# Patient Record
Sex: MALE | Race: WHITE | NOT HISPANIC OR LATINO | Employment: OTHER | URBAN - METROPOLITAN AREA
[De-identification: names, ages, dates, MRNs, and addresses within clinical notes are randomized per-mention and may not be internally consistent; named-entity substitution may affect disease eponyms.]

---

## 2023-03-09 ENCOUNTER — OFFICE VISIT (OUTPATIENT)
Dept: PRIMARY CARE | Facility: CLINIC | Age: 68
End: 2023-03-09
Payer: MEDICARE

## 2023-03-09 VITALS
HEIGHT: 71 IN | WEIGHT: 228.2 LBS | BODY MASS INDEX: 31.95 KG/M2 | TEMPERATURE: 98.1 F | DIASTOLIC BLOOD PRESSURE: 81 MMHG | RESPIRATION RATE: 17 BRPM | HEART RATE: 62 BPM | SYSTOLIC BLOOD PRESSURE: 144 MMHG

## 2023-03-09 DIAGNOSIS — M54.50 ACUTE RIGHT-SIDED LOW BACK PAIN WITHOUT SCIATICA: Primary | ICD-10-CM

## 2023-03-09 PROBLEM — F43.20 ADJUSTMENT DISORDER: Status: ACTIVE | Noted: 2023-03-09

## 2023-03-09 PROBLEM — J18.9 COMMUNITY ACQUIRED PNEUMONIA: Status: RESOLVED | Noted: 2023-03-09 | Resolved: 2023-03-09

## 2023-03-09 PROBLEM — K64.9 HEMORRHOIDS: Status: RESOLVED | Noted: 2023-03-09 | Resolved: 2023-03-09

## 2023-03-09 PROBLEM — R35.0 INCREASED URINARY FREQUENCY: Status: RESOLVED | Noted: 2023-03-09 | Resolved: 2023-03-09

## 2023-03-09 PROBLEM — F43.20 ADJUSTMENT DISORDER: Status: RESOLVED | Noted: 2023-03-09 | Resolved: 2023-03-09

## 2023-03-09 PROBLEM — K63.5 HYPERPLASTIC COLONIC POLYP: Status: ACTIVE | Noted: 2023-03-09

## 2023-03-09 PROBLEM — R35.0 INCREASED URINARY FREQUENCY: Status: ACTIVE | Noted: 2023-03-09

## 2023-03-09 PROBLEM — K64.9 HEMORRHOIDS: Status: ACTIVE | Noted: 2023-03-09

## 2023-03-09 PROBLEM — R93.1 AGATSTON CAC SCORE, >400: Status: ACTIVE | Noted: 2023-03-09

## 2023-03-09 LAB
POC APPEARANCE, URINE: CLEAR
POC BILIRUBIN, URINE: NEGATIVE
POC BLOOD, URINE: NEGATIVE
POC COLOR, URINE: YELLOW
POC GLUCOSE, URINE: NEGATIVE MG/DL
POC KETONES, URINE: NEGATIVE MG/DL
POC LEUKOCYTES, URINE: NEGATIVE
POC NITRITE,URINE: NEGATIVE
POC PH, URINE: 7.5 PH
POC PROTEIN, URINE: NEGATIVE MG/DL
POC SPECIFIC GRAVITY, URINE: 1.01
POC UROBILINOGEN, URINE: 2 EU/DL

## 2023-03-09 PROCEDURE — 1170F FXNL STATUS ASSESSED: CPT | Performed by: FAMILY MEDICINE

## 2023-03-09 PROCEDURE — 81003 URINALYSIS AUTO W/O SCOPE: CPT | Performed by: FAMILY MEDICINE

## 2023-03-09 PROCEDURE — 1159F MED LIST DOCD IN RCRD: CPT | Performed by: FAMILY MEDICINE

## 2023-03-09 PROCEDURE — 1160F RVW MEDS BY RX/DR IN RCRD: CPT | Performed by: FAMILY MEDICINE

## 2023-03-09 PROCEDURE — 3008F BODY MASS INDEX DOCD: CPT | Performed by: FAMILY MEDICINE

## 2023-03-09 PROCEDURE — 99213 OFFICE O/P EST LOW 20 MIN: CPT | Performed by: FAMILY MEDICINE

## 2023-03-09 RX ORDER — ROSUVASTATIN CALCIUM 5 MG/1
1 TABLET, COATED ORAL DAILY
COMMUNITY
End: 2024-02-26 | Stop reason: SDUPTHER

## 2023-03-09 RX ORDER — DICLOFENAC SODIUM 10 MG/G
4 GEL TOPICAL 4 TIMES DAILY PRN
Qty: 240 G | Refills: 1 | Status: SHIPPED | OUTPATIENT
Start: 2023-03-09 | End: 2023-04-08

## 2023-03-09 ASSESSMENT — ACTIVITIES OF DAILY LIVING (ADL)
FEEDING YOURSELF: INDEPENDENT
JUDGMENT_ADEQUATE_SAFELY_COMPLETE_DAILY_ACTIVITIES: YES
HEARING - LEFT EAR: FUNCTIONAL
DRESSING YOURSELF: INDEPENDENT
WALKS IN HOME: INDEPENDENT
ADEQUATE_TO_COMPLETE_ADL: YES
HEARING - RIGHT EAR: FUNCTIONAL
BATHING: INDEPENDENT
GROOMING: INDEPENDENT
TOILETING: INDEPENDENT
PATIENT'S MEMORY ADEQUATE TO SAFELY COMPLETE DAILY ACTIVITIES?: YES

## 2023-03-09 ASSESSMENT — ENCOUNTER SYMPTOMS
LOSS OF SENSATION IN FEET: 0
HEMATURIA: 0
DIFFICULTY URINATING: 0
CHILLS: 0
OCCASIONAL FEELINGS OF UNSTEADINESS: 0
FEVER: 0
DYSURIA: 0

## 2023-03-09 ASSESSMENT — PAIN SCALES - GENERAL: PAINLEVEL: 2

## 2023-03-09 ASSESSMENT — COLUMBIA-SUICIDE SEVERITY RATING SCALE - C-SSRS
6. HAVE YOU EVER DONE ANYTHING, STARTED TO DO ANYTHING, OR PREPARED TO DO ANYTHING TO END YOUR LIFE?: NO
2. HAVE YOU ACTUALLY HAD ANY THOUGHTS OF KILLING YOURSELF?: NO
1. IN THE PAST MONTH, HAVE YOU WISHED YOU WERE DEAD OR WISHED YOU COULD GO TO SLEEP AND NOT WAKE UP?: NO

## 2023-03-09 ASSESSMENT — PATIENT HEALTH QUESTIONNAIRE - PHQ9
1. LITTLE INTEREST OR PLEASURE IN DOING THINGS: NOT AT ALL
2. FEELING DOWN, DEPRESSED OR HOPELESS: NOT AT ALL
SUM OF ALL RESPONSES TO PHQ9 QUESTIONS 1 AND 2: 0

## 2023-03-09 NOTE — PATIENT INSTRUCTIONS
Get xray done of back.     Voltaren gel (Diclofenac) 2 gm up to 4 times a day to affected area for pain.     Medicare wellness with labs first in November.

## 2023-03-09 NOTE — PROGRESS NOTES
"Subjective   Patient ID: Jabari Estrada is a 67 y.o. male who presents for Back Pain (Patient complains of lower back pain that started off and on since January.).    Low back pain, just below right flank, More achey. Kind of feels like when stone up in his kidney before it moved down in the past. Prior to this, was running on the treadmill to practice for his stress test. He is able to play golf and basketball and it hurts but it is not enough to stop him. Goes away overnight.   No problem urinating. He is on [employer] so diet has changed.   No blood in urine.     No particular movements that make it worse, Is there when up walking around.          Review of Systems   Constitutional:  Negative for chills and fever.   Genitourinary:  Negative for decreased urine volume, difficulty urinating, dysuria, hematuria and urgency.           Objective   /81   Pulse 62   Temp 36.7 °C (98.1 °F)   Resp 17   Ht 1.803 m (5' 11\")   Wt 104 kg (228 lb 3.2 oz)   BMI 31.83 kg/m²     Physical Exam  Vitals reviewed.   Constitutional:       General: He is not in acute distress.     Appearance: Normal appearance.   Pulmonary:      Effort: Pulmonary effort is normal.   Musculoskeletal:      Comments: ROM back with limited extension, tender to right of upper lumbar PVMs. No actual CVA tenderness. Normal strength, rom BLE. Tight hamstrings.    Neurological:      Mental Status: He is alert.   Psychiatric:         Mood and Affect: Mood normal.         Behavior: Behavior normal.       Recent Results (from the past 168 hour(s))   POCT UA Automated manually resulted    Collection Time: 03/09/23 11:58 AM   Result Value Ref Range    POC Color, Urine Yellow Straw, Yellow, Light Yellow    POC Appearance, Urine Clear Clear    POC Specific Gravity, Urine 1.010 1.005 - 1.035    POC PH, Urine 7.5 No Reference Range Established PH    POC Protein, Urine NEGATIVE NEGATIVE, 30 (1+) mg/dl    POC Glucose, Urine NEGATIVE NEGATIVE mg/dl    " POC Blood, Urine NEGATIVE NEGATIVE    POC Ketones, Urine NEGATIVE NEGATIVE mg/dl    POC Bilirubin, Urine NEGATIVE NEGATIVE    POC Urobilinogen, Urine 2.0 (A) 0.2, 1.0 EU/DL    Poc Nitrate, Urine NEGATIVE NEGATIVE    POC Leukocytes, Urine NEGATIVE NEGATIVE           Assessment/Plan   Problem List Items Addressed This Visit    None  Visit Diagnoses       Acute right-sided low back pain without sciatica    -  Primary    History urolithiasis, no pain with urination. UA ordered and negative. Check XR. Voltaren gel. Refer to PT if desired.    Relevant Medications    diclofenac sodium (Voltaren) 1 % gel gel    Other Relevant Orders    POCT UA Automated manually resulted (Completed)    XR lumbar spine complete 4+ views    Referral to Physical Therapy              Assessment, plans, tests, and follow up discussed with patient and patient verbalized understanding. Jabari was given an opportunity to ask questions and  any concerns were addressed including but not limited to tests, orders, indications to call.

## 2023-11-09 ENCOUNTER — APPOINTMENT (OUTPATIENT)
Dept: PRIMARY CARE | Facility: CLINIC | Age: 68
End: 2023-11-09
Payer: MEDICARE

## 2023-11-21 ENCOUNTER — OFFICE VISIT (OUTPATIENT)
Dept: PRIMARY CARE | Facility: CLINIC | Age: 68
End: 2023-11-21
Payer: MEDICARE

## 2023-11-21 VITALS
SYSTOLIC BLOOD PRESSURE: 127 MMHG | TEMPERATURE: 97.5 F | HEART RATE: 72 BPM | DIASTOLIC BLOOD PRESSURE: 86 MMHG | HEIGHT: 72 IN | BODY MASS INDEX: 33.13 KG/M2 | RESPIRATION RATE: 14 BRPM | OXYGEN SATURATION: 98 % | WEIGHT: 244.6 LBS

## 2023-11-21 DIAGNOSIS — Z87.891 FORMER SMOKER, STOPPED SMOKING IN DISTANT PAST: ICD-10-CM

## 2023-11-21 DIAGNOSIS — Z23 NEED FOR VACCINATION: ICD-10-CM

## 2023-11-21 DIAGNOSIS — Z00.00 WELL ADULT EXAM: Primary | ICD-10-CM

## 2023-11-21 DIAGNOSIS — Z12.5 SCREENING FOR MALIGNANT NEOPLASM OF PROSTATE: ICD-10-CM

## 2023-11-21 DIAGNOSIS — R93.1 AGATSTON CAC SCORE, >400: ICD-10-CM

## 2023-11-21 DIAGNOSIS — E78.5 HYPERLIPIDEMIA, UNSPECIFIED HYPERLIPIDEMIA TYPE: ICD-10-CM

## 2023-11-21 DIAGNOSIS — D12.2 ADENOMATOUS POLYP OF ASCENDING COLON: ICD-10-CM

## 2023-11-21 PROCEDURE — 90662 IIV NO PRSV INCREASED AG IM: CPT | Performed by: FAMILY MEDICINE

## 2023-11-21 PROCEDURE — 99397 PER PM REEVAL EST PAT 65+ YR: CPT | Performed by: FAMILY MEDICINE

## 2023-11-21 PROCEDURE — 1036F TOBACCO NON-USER: CPT | Performed by: FAMILY MEDICINE

## 2023-11-21 PROCEDURE — 1160F RVW MEDS BY RX/DR IN RCRD: CPT | Performed by: FAMILY MEDICINE

## 2023-11-21 PROCEDURE — 1125F AMNT PAIN NOTED PAIN PRSNT: CPT | Performed by: FAMILY MEDICINE

## 2023-11-21 PROCEDURE — 1170F FXNL STATUS ASSESSED: CPT | Performed by: FAMILY MEDICINE

## 2023-11-21 PROCEDURE — G0008 ADMIN INFLUENZA VIRUS VAC: HCPCS | Performed by: FAMILY MEDICINE

## 2023-11-21 PROCEDURE — 1159F MED LIST DOCD IN RCRD: CPT | Performed by: FAMILY MEDICINE

## 2023-11-21 PROCEDURE — 3008F BODY MASS INDEX DOCD: CPT | Performed by: FAMILY MEDICINE

## 2023-11-21 ASSESSMENT — ENCOUNTER SYMPTOMS
VOMITING: 0
JOINT SWELLING: 0
UNEXPECTED WEIGHT CHANGE: 0
DEPRESSION: 0
ARTHRALGIAS: 0
NAUSEA: 0
HEADACHES: 0
CONSTIPATION: 0
FREQUENCY: 0
DYSPHORIC MOOD: 0
POLYPHAGIA: 0
ABDOMINAL PAIN: 0
SHORTNESS OF BREATH: 0
ACTIVITY CHANGE: 1
WHEEZING: 0
FATIGUE: 0
POLYDIPSIA: 0
DECREASED CONCENTRATION: 0
DIZZINESS: 0
BLOOD IN STOOL: 0
COUGH: 0
OCCASIONAL FEELINGS OF UNSTEADINESS: 0
TROUBLE SWALLOWING: 0
APPETITE CHANGE: 0
PALPITATIONS: 0
NERVOUS/ANXIOUS: 0
DIARRHEA: 0
LOSS OF SENSATION IN FEET: 0
DIFFICULTY URINATING: 0
SEIZURES: 0

## 2023-11-21 ASSESSMENT — ACTIVITIES OF DAILY LIVING (ADL)
DRESSING: INDEPENDENT
MANAGING_FINANCES: INDEPENDENT
DOING_HOUSEWORK: INDEPENDENT
GROCERY_SHOPPING: INDEPENDENT
TAKING_MEDICATION: INDEPENDENT
BATHING: INDEPENDENT

## 2023-11-21 ASSESSMENT — PATIENT HEALTH QUESTIONNAIRE - PHQ9
2. FEELING DOWN, DEPRESSED OR HOPELESS: NOT AT ALL
1. LITTLE INTEREST OR PLEASURE IN DOING THINGS: NOT AT ALL
SUM OF ALL RESPONSES TO PHQ9 QUESTIONS 1 AND 2: 0

## 2023-11-21 NOTE — PROGRESS NOTES
Subjective   Patient ID: Jabari Estrada is a 68 y.o. male who presents for Medicare Wellness.    Here for follow up and Medicare Wellness.     He was put on Rosuvastatin 5 mg daily after CAC showed high score but had neg stress echo. Saw Dr. Granados. Due for labs. Has follow up scheduled in February.     Has had some back pain for which he has done PT, he has figured it is only when golfs. He is sleeping better.     Here for annual wellness exam. Last wellness over year.     New concerns:no  Feels: well    Changes  to health/medications since last visit No   Other providers seen since last visit PT    Healthy lifestyle habits: Regular exercise: Yes, has cut back a little due to back. He wants to lose weight.                                         Dietary habits: varies                                        Social connections/relationships good                                        Wears seatbelts Yes                                         Sunscreen Yes                                         Sexual Risk FactorsNo      Health screenings:  PSA done several years ago                                  Colon screening Jan 2023, SSA due for repeat Jan 2026                                  Hep C screening neg 11/7/2022                                  HIV screening no, not interested                                  STI screeningnot applicable                          Health risks: Domestic Violence no                      Work-life balance retired from teaching, 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                       Smoke detectors Yes                       Carbon monoxide detectors yes                      Gun safety not applicable                      Second-hand Smoke No                       Occupational Risks No     Immunizations:  Influenza:  due, and will get today                            Tdap: 2017                            HPV: not applicable                           Pneumonia: Had pneumovax, due for  prevnar 15, will get at Pharmacy.                           Shingrix: no                           COVID: Moderna times 4, due for booster. Scheduled in 3 days af Pharmacy.              Patient Care Team:  Payton Jackman MD as PCP - General (Family Medicine)  Payton Jackman MD as PCP - [employer] PCP  Eran Granados DO as Consulting Physician (Cardiology)    Patient Active Problem List   Diagnosis    Adenomatous polyp of ascending colon    Agatston CAC score, >400    Former smoker, stopped smoking in distant past         Current Outpatient Medications:     rosuvastatin (Crestor) 5 mg tablet, Take 1 tablet (5 mg) by mouth once daily., Disp: , Rfl:     Past Medical, Surgical, Family, Social, and Substance Histories Reviewed.     Medicare Wellness Questionnaires and Histories in Nursing Notes Reviewed.     Review of Systems   Constitutional:  Positive for activity change. Negative for appetite change, fatigue and unexpected weight change.        Has been more sedentary lately.    HENT:  Negative for dental problem, hearing loss and trouble swallowing.    Eyes:  Negative for visual disturbance.        Has eye appointment for floaters.    Respiratory:  Negative for cough, shortness of breath and wheezing.    Cardiovascular:  Negative for chest pain, palpitations and leg swelling.   Gastrointestinal:  Negative for abdominal pain, blood in stool, constipation, diarrhea, nausea and vomiting.   Endocrine: Negative for cold intolerance, heat intolerance, polydipsia, polyphagia and polyuria.   Genitourinary:  Negative for difficulty urinating, frequency, testicular pain and urgency.   Musculoskeletal:  Negative for arthralgias and joint swelling.        Joints a little stiffer than used to be.    Skin:  Negative for rash.   Neurological:  Negative for dizziness, seizures, syncope and headaches.   Psychiatric/Behavioral:  Negative for behavioral problems, decreased concentration and dysphoric mood.  "The patient is not nervous/anxious.        Objective   /86   Pulse 72   Temp 36.4 °C (97.5 °F)   Resp 14   Ht 1.822 m (5' 11.75\")   Wt 111 kg (244 lb 9.6 oz)   SpO2 98%   BMI 33.41 kg/m²     Physical Exam  Constitutional:       Appearance: Normal appearance.   HENT:      Head: Normocephalic and atraumatic.      Right Ear: Tympanic membrane normal.      Left Ear: Tympanic membrane normal.      Nose: Nose normal.      Mouth/Throat:      Pharynx: Oropharynx is clear.   Eyes:      Extraocular Movements: Extraocular movements intact.      Conjunctiva/sclera: Conjunctivae normal.      Pupils: Pupils are equal, round, and reactive to light.   Neck:      Vascular: No carotid bruit.   Cardiovascular:      Rate and Rhythm: Normal rate and regular rhythm.      Pulses: Normal pulses.      Heart sounds: No murmur heard.  Pulmonary:      Effort: Pulmonary effort is normal.      Breath sounds: Normal breath sounds.   Abdominal:      Palpations: There is no hepatomegaly, splenomegaly or mass.      Tenderness: There is no abdominal tenderness.   Musculoskeletal:         General: Normal range of motion.      Cervical back: Normal range of motion.      Left lower leg: No edema.   Skin:     General: Skin is warm and dry.      Findings: No rash.   Neurological:      General: No focal deficit present.      Mental Status: He is alert. Mental status is at baseline.      Gait: Gait is intact.   Psychiatric:         Mood and Affect: Mood normal.         Thought Content: Thought content normal.     No results found for this or any previous visit (from the past 2016 hour(s)).      Assessment/Plan   Problem List Items Addressed This Visit       Adenomatous polyp of ascending colon     Jan 18, 2023 colonoscopy, sessile serrated adenoma. Repeat due in 2026.         Agatston CAC score, >400     Started on Rosuvastatin due to family hx and CAC score. Stress test was low risk. Due for labs.          Former smoker, stopped smoking in " distant past     Had AAA screen. Stopped smoking too remotely for LDCT.           Other Visit Diagnoses       Well adult exam    -  Primary    DIscussed healthy lifestyle, immunizations, preventive measures. Due for Flu and Prevnar 15.    Need for vaccination        Due for HD flu, COVID booster, Shingrix adn Prevnar 15.              Assessment, plans, tests, and follow up discussed with patient and patient verbalized understanding. Jabari was given an opportunity to ask questions and  any concerns were addressed including but not limited to prevention, lab orders, follow up.

## 2023-11-21 NOTE — PATIENT INSTRUCTIONS
Get Prevnar 15 shot at pharmacy.     Due for Shingles shots.     Have fasting lab done.     Keep follow up with Dr. Granados.

## 2023-11-22 ENCOUNTER — LAB (OUTPATIENT)
Dept: LAB | Facility: LAB | Age: 68
End: 2023-11-22
Payer: MEDICARE

## 2023-11-22 DIAGNOSIS — E78.5 HYPERLIPIDEMIA, UNSPECIFIED HYPERLIPIDEMIA TYPE: ICD-10-CM

## 2023-11-22 DIAGNOSIS — Z12.5 SCREENING FOR MALIGNANT NEOPLASM OF PROSTATE: ICD-10-CM

## 2023-11-22 LAB
ALBUMIN SERPL BCP-MCNC: 4.4 G/DL (ref 3.4–5)
ALP SERPL-CCNC: 52 U/L (ref 33–136)
ALT SERPL W P-5'-P-CCNC: 22 U/L (ref 10–52)
AST SERPL W P-5'-P-CCNC: 21 U/L (ref 9–39)
BILIRUB DIRECT SERPL-MCNC: 0.2 MG/DL (ref 0–0.3)
BILIRUB SERPL-MCNC: 0.8 MG/DL (ref 0–1.2)
CHOLEST SERPL-MCNC: 145 MG/DL (ref 0–199)
CHOLESTEROL/HDL RATIO: 3.4
HDLC SERPL-MCNC: 42.3 MG/DL
LDLC SERPL CALC-MCNC: 81 MG/DL
NON HDL CHOLESTEROL: 103 MG/DL (ref 0–149)
PROT SERPL-MCNC: 6.9 G/DL (ref 6.4–8.2)
PSA SERPL-MCNC: 0.61 NG/ML
TRIGL SERPL-MCNC: 108 MG/DL (ref 0–149)
VLDL: 22 MG/DL (ref 0–40)

## 2023-11-22 PROCEDURE — 36415 COLL VENOUS BLD VENIPUNCTURE: CPT

## 2023-11-22 PROCEDURE — 80076 HEPATIC FUNCTION PANEL: CPT

## 2023-11-22 PROCEDURE — 80061 LIPID PANEL: CPT

## 2023-11-22 PROCEDURE — G0103 PSA SCREENING: HCPCS

## 2024-02-20 PROBLEM — I25.84 CORONARY ARTERY CALCIFICATION: Status: ACTIVE | Noted: 2024-02-20

## 2024-02-20 PROBLEM — I25.10 CORONARY ARTERY CALCIFICATION: Status: ACTIVE | Noted: 2024-02-20

## 2024-02-20 PROBLEM — E66.9 OBESITY (BMI 30-39.9): Status: ACTIVE | Noted: 2024-02-20

## 2024-02-20 NOTE — PROGRESS NOTES
[facility]    Patient Name: Jabari Estrada  Patient : 1955      Scribe Attestation  By signing my name below, IJoana Scribe   attest that this documentation has been prepared under the direction and in the presence of Eran Granados DO.      Reason for visit:  This is a 68-year-old male here for follow-up regarding coronary calcification and obesity.      HPI:  This is a 68-year-old male here for follow-up regarding coronary calcification and obesity.  The patient was last evaluated by me in 2023 at which time he was doing reasonably well and asked that he follow-up in 1 year.  Hepatic panel done in 2023 showed normal ALT/AST.  Lipid panel done in 2023 showed an LDL cholesterol of 81 and triglycerides of 108 while on rosuvastatin 5 mg daily. ECG done today showed sinus rhythm with a heart rate of 77 bpm.  The patient reports that he has been feeling generally well from the cardiac standpoint. He denies any new chest pain, shortness of breath, palpitations and lightheadedness. He states that he had been exercising regularly to lose weight. He states that he takes all of his medications as prescribed. During my exam, he was resting comfortably on the exam table.            Assessment/Plan:   1. Coronary artery calcification   The patient has a history of coronary artery calcification with a total score of 1016.   ECG done today showed sinus rhythm with a heart rate of 77 bpm.    He denies anginal chest discomfort.   Blood pressure appears controlled on exam today.  Recent lab works as noted in the HPI.   Nuclear stress done 2023 showed a small partially reversible perfusion defect in the apical wall which improved on prone imaging suggesting soft tissue attenuation, low probability of ischemia, calculated ejection fraction 61%.  Lipid panel done in 2023 showed an LDL cholesterol of 81 and triglycerides of 108  while on rosuvastatin 5 mg daily.   I would like to see a further decrease in his LDL cholesterol. I will increase rosuvastatin to 10 mg daily.  Please see lifestyle recommendations below.  Follow up in 1 year and sooner if necessary.      2. Obesity  Please see lifestyle recommendations below.      Orders:   Increase rosuvastatin to 10 mg daily,   Follow-up in 1 year.    Lifestyle Recommendations  I recommend a whole-food plant-based diet, an eating pattern that encourages the consumption of unrefined plant foods (such as fruits, vegetables, tubers, whole grains, legumes, nuts and seeds) and discourages meats, dairy products, eggs and processed foods.     The AHA/ACC recommends that the patient consume a dietary pattern that emphasizes intake of vegetables, fruits, and whole grains; includes low-fat dairy products, poultry, fish, legumes, non-tropical vegetable oils, and nuts; and limits intake of sodium, sweets, sugar-sweetened beverages, and red meats.  Adapt this dietary pattern to appropriate calorie requirements (a 500-750 kcal/day deficit to loose weight), personal and cultural food preferences, and nutrition therapy for other medical conditions (including diabetes).  Achieve this pattern by following plans such as the [place] Mediterranean, DASH dietary pattern, or AHA diet.     Engage in 2 hours and 30 minutes per week of moderate-intensity physical activity, or 1 hour and 15 minutes (75 minutes) per week of vigorous-intensity aerobic physical activity, or an equivalent combination of moderate and vigorous-intensity aerobic physical activity. Aerobic activity should be performed in episodes of at least 10 minutes preferably spread throughout the week.     Adhering to a heart healthy diet, regular exercise habits, avoidance of tobacco products, and maintenance of a healthy weight are crucial components of their heart disease risk reduction.     Any positive review of systems not specifically addressed in the  office visit today should be evaluated and treated by the patients primary care physician or in an emergency department if necessary     Patient was notified that results from ordered tests will be called to the patient if it changes current management; it will otherwise be discussed at a future appointment and available on [facility].     Thank you for allowing me to participate in the care of this patient.        This document was generated using the assistance of voice recognition software. If there are any errors of spelling, grammar, syntax, or meaning; please feel free to contact me directly for clarification.    Past Medical History:  He has a past medical history of Adenomatous polyp of ascending colon (03/09/2023), Adjustment disorder (03/09/2023), Agatston CAC score, >400 (03/09/2023), Former smoker, stopped smoking in distant past (11/21/2023), Hemorrhoids (03/09/2023), and Personal history of urinary calculi.    Past Surgical History:  He has a past surgical history that includes Knee arthroscopy w/ debridement (06/23/2016) and Other surgical history (11/28/2022).      Social History:  He reports that he has never smoked. He has been exposed to tobacco smoke. He has never used smokeless tobacco. He reports that he does not currently use alcohol after a past usage of about 1.0 standard drink of alcohol per week. He reports that he does not currently use drugs.    Family History:  No family history on file.     Allergies:  Iodine    Outpatient Medications:  Current Outpatient Medications   Medication Instructions    rosuvastatin (Crestor) 5 mg tablet 1 tablet, oral, Daily        ROS:  A 14 point review of systems was done and is negative other than as stated in HPI    Vitals:      12/15/2022    10:00 AM 1/5/2023     2:57 PM 1/11/2023     1:35 PM 1/18/2023     7:43 AM 2/16/2023    11:39 AM 3/9/2023    11:15 AM 11/21/2023    11:21 AM   Vitals   Systolic   132  128 144 127   Diastolic   90  72 81 86   Heart  "Rate  63 72  73 62 72   Temp 36.3 °C (97.4 °F) 36.4 °C (97.6 °F)    36.7 °C (98.1 °F) 36.4 °C (97.5 °F)   Resp      17 14   Height (in) 1.778 m (5' 10\") 1.778 m (5' 10\") 1.778 m (5' 10\") 1.801 m (5' 10.91\") 1.778 m (5' 10\") 1.803 m (5' 11\") 1.822 m (5' 11.75\")   Weight (lb)   230 225.53 224 228.2 244.6   BMI 33.07 kg/m2 33.07 kg/m2 33 kg/m2 31.54 kg/m2 32.14 kg/m2 31.83 kg/m2 33.41 kg/m2   BSA (m2) 2.28 m2 2.28 m2 2.27 m2 2.26 m2 2.24 m2 2.28 m2 2.37 m2        Physical Exam:   Constitutional: Cooperative, in no acute distress, alert, appears stated age.   Skin: Skin color, texture, turgor normal. No rashes or lesions.   Head: Normocephalic. No masses, lesions, tenderness or abnormalities   Eyes: Extraocular movements are grossly intact.   Mouth and throat: Mucous membranes moist   Neck: Neck supple, no carotid bruits, no JVD   Respiratory: Lungs clear to auscultation, no wheezing or rhonchi, no use of accessory muscles   Chest wall: No scars, normal excursion with respiration   Cardiovascular: Regular rhythm without murmur, gallop, or rubs   Gastrointestinal: Abdomen soft, nontender. Bowel sounds normal. Obese.  Musculoskeletal: Strength equal in upper extremities   Extremities: Trace pitting edema   Neurologic: Sensation grossly intact, alert and oriented x3    Intake/Output:   No intake/output data recorded.    Outpatient Medications  Current Outpatient Medications on File Prior to Visit   Medication Sig Dispense Refill    rosuvastatin (Crestor) 5 mg tablet Take 1 tablet (5 mg) by mouth once daily.       No current facility-administered medications on file prior to visit.       Labs: (past 26 weeks)  Recent Results (from the past 4368 hour(s))   Lipid Panel    Collection Time: 11/22/23  9:03 AM   Result Value Ref Range    Cholesterol 145 0 - 199 mg/dL    HDL-Cholesterol 42.3 mg/dL    Cholesterol/HDL Ratio 3.4     LDL Calculated 81 <=99 mg/dL    VLDL 22 0 - 40 mg/dL    Triglycerides 108 0 - 149 mg/dL    Non HDL " Cholesterol 103 0 - 149 mg/dL   Hepatic Function Panel    Collection Time: 11/22/23  9:03 AM   Result Value Ref Range    Albumin 4.4 3.4 - 5.0 g/dL    Bilirubin, Total 0.8 0.0 - 1.2 mg/dL    Bilirubin, Direct 0.2 0.0 - 0.3 mg/dL    Alkaline Phosphatase 52 33 - 136 U/L    ALT 22 10 - 52 U/L    AST 21 9 - 39 U/L    Total Protein 6.9 6.4 - 8.2 g/dL   Prostate Specific Antigen    Collection Time: 11/22/23  9:03 AM   Result Value Ref Range    Prostate Specific AG 0.61 <=4.00 ng/mL       ECG  No results found for this or any previous visit (from the past 4464 hour(s)).    Echocardiogram  No results found for this or any previous visit from the past 1095 days.      CV Studies:  EKG: No results found for this or any previous visit (from the past 4464 hour(s)).  Echocardiogram: No results found for this or any previous visit from the past 1825 days.    Stress Testing [facility]([id number]):   NM CARDIAC STRESS REST (MYOCARDIAL PERFUSION MIBI) 01/25/2023    Narrative  MRN: [id number]  Patient Name: FARHAN WILSON    STUDY:  CARDIAC STRESS/REST INJECTION; PART 2 STRESS OR REST (NO CHARGE);  CARDIAC STRESS/REST (MYOCARDIAL PERFUSION/MIBI);  1/25/2023 9:33 am    INDICATION:  Coronary artery calcification  I25.10: Coronary artery calcification  E66.9: Obesity I25.84:.    COMPARISON:  None.    ACCESSION NUMBER(S):  [id number]; [id number]; [id number]    ORDERING CLINICIAN:  MARIA DOLORES MARIE    TECHNIQUE:  DIVISION OF NUCLEAR MEDICINE  STRESS MYOCARDIAL PERFUSION SCAN, ONE DAY PROTOCOL    The patient received an intravenous dose of  11.2 mCi of Tc-99m  tetrofosmin and resting emission tomographic (SPECT) images of the  myocardium were acquired. The patient then exercised via treadmill  stress to  97 % of MPHR and achieved  10.1 METS. At peak stress  31.7  mCi of Tc-99m tetrofosmin were administered and stress phase SPECT  images of the myocardium were then acquired. These included ECG-gated  images to assess and quantify ventricular  function.    FINDINGS:    There is a small partially reversible perfusion defect in the apical  wall which is improved on prone imaging suggestive of soft tissue  attenuation. The gated ejection fraction is  61%(Normal over 50%).    Impression  1. There is a small partially reversible perfusion defect in the  apical wall which is improved on prone imaging suggestive of soft  tissue attenuation. There is a low probability of ischemia.  2. Calculated ejection fraction is 61% without segmental wall motion  abnormality seen.    Cardiac Catheterization: No results found for this or any previous visit from the past 1825 days.  No results found for this or any previous visit from the past 3650 days.     Cardiac Scoring:   CT HEART CALCIUM SCORING WO IV CONTRAST 01/03/2023    Narrative  MRN: [id number]  Patient Name: FARHAN WILSON    STUDY:  CT CARDIAC SCORING;  1/3/2023 12:30 pm    INDICATION:  Screening  Z12.11: Screening for colon cancer.    COMPARISON:  None.    ACCESSION NUMBER(S):  [id number]    ORDERING CLINICIAN:  VIDHYA ZENG    TECHNIQUE:  Using prospective ECG gating, CT scan of the coronary arteries was  performed without intravenous contrast. Coronary calcium scoring  was  performed according to the method of Agatston.    FINDINGS:  The score and distribution of calcium in the coronary arteries is as  follows:    LM 0,  ,  LCx 212,  ,    Total 1016    The visualized ascending thoracic aorta measures 3.6 cm in diameter.  The heart is normal in size. No pericardial effusion is present.    No gross evidence of mediastinal or hilar lymphadenopathy or masses  is identified. The visualized segments of the lungs are normally  expanded.    The main pulmonary artery, right and left pulmonary artery are normal  in size.    The visualized subdiaphragmatic structures appear intact.    Impression  1. Coronary artery calcium score of 1016*.    *Coronary Artery  Agatston score    Score  risk  Very low  1-99  Mildly increased 100-299  Moderately increased >300  Moderate to severely increased >800    Austin et al. JCCT 2016 (http://dx.doi.org/10.1016/j.jcct.2016.11.003)    H[place]AL[place] 10-Year CHD Risk with Coronary Artery Calcification can be  calcuate using link below  [email or web address]://[email or web address]-[email or web address].[email or web address]  Inderjit et al. JACC 2015 (http://dx.doi.org/10.1016/j.j  acc.2015.08.035)    AAA : No results found for this or any previous visit from the past 1825 days.    OTHER: No results found for this or any previous visit from the past 1825 days.    LAST IMAGING RESULTS  Colonoscopy  Patient Name: Jabari Estrada  Procedure Date: 1/18/2023 8:43 AM  MRN: [id number]  Account Number: [id number]  YOB: 1955  Admit Type: Outpatient  Site: [facility] 3  Ethnicity: Not  or   Race: White  Attending MD: Lillian Peter MD, [id number]  Procedure:             Colonoscopy  Indications:           High risk colon cancer surveillance: Personal history                          of colonic polyps  Providers:             Lillian Peter MD (Doctor)  Referring:             Payton Jackman MD  Medicines:             Propofol per Anesthesia  Complications:         No immediate complications.  Procedure:             Pre-Anesthesia Assessment:                         - Prior to the procedure, a History and Physical was                          performed, and patient medications and allergies were                          reviewed. The patient is competent. The risks and                          benefits of the procedure and the sedation options and                          risks were discussed with the patient. All questions                          were answered and informed consent was obtained.                          Patient identification and proposed procedure were                          verified by the physician, the nurse and the                          anesthetist in  the endoscopy suite. Mental Status                          Examination: alert and oriented. Prophylactic                          Antibiotics: The patient does not require prophylactic                          antibiotics. Prior Anticoagulants: The patient has                          taken no anticoagulant or antiplatelet agents. ASA                          Grade Assessment: II - A patient with mild systemic                          disease. After reviewing the risks and benefits, the                          patient was deemed in satisfactory condition to                          undergo the procedure. The anesthesia plan was to use                          monitored anesthesia care (MAC). Immediately prior to                          administration of medications, the patient was                          re-assessed for adequacy to receive sedatives. The                          heart rate, respiratory rate, oxygen saturations,                          blood pressure, adequacy of pulmonary ventilation, and                          response to care were monitored throughout the                          procedure. The physical status of the patient was                          re-assessed after the procedure.                         After I obtained informed consent, the scope was                          passed under direct vision. Throughout the procedure,                          the patient's blood pressure, pulse, and oxygen                          saturations were monitored continuously. The pediatric                          colonoscope was introduced through the anus and                          advanced to the terminal ileum. The colonoscopy was                          somewhat difficult due to a redundant colon.                          Successful completion of the procedure was aided by                          using manual pressure. The patient tolerated the                          procedure well.  The quality of the bowel preparation                          was good. The terminal ileum, ileocecal valve,                          appendiceal orifice, and rectum were photographed. The                          quality of the bowel preparation was good. The                          terminal ileum, ileocecal valve, appendiceal orifice,                          and rectum were photographed.  Findings:       Hemorrhoids were found on perianal exam.       A few small-mouthed diverticula were found in the sigmoid colon.       A 1 mm polyp was found in the ascending colon. The polyp was sessile.        The polyp was removed with a cold snare. Resection and retrieval were        complete. Verification of patient identification for the specimen was        done. Estimated blood loss was minimal.       The terminal ileum appeared normal.       No additional abnormalities were found on retroflexion.  Impression:            - Hemorrhoids found on perianal exam.                         - Diverticulosis in the sigmoid colon.                         - One 1 mm polyp in the ascending colon, removed with                          a cold snare. Resected and retrieved.                         - The examined portion of the ileum was normal.  Recommendation:        - Discharge patient to home.                         - Resume previous diet.                         - Continue present medications.                         - Await pathology results.                         - Repeat colonoscopy in 7-10 years for surveillance.  Procedure Code(s):     --- Professional ---                         82341, Colonoscopy, flexible; with removal of                          tumor(s), polyp(s), or other lesion(s) by snare                          technique  Diagnosis Code(s):     --- Professional ---                         Z12.11, Encounter for screening for malignant neoplasm                          of colon                         Z86.010,  Personal history of colonic polyps                         K64.9, Unspecified hemorrhoids                         D12.2, Benign neoplasm of ascending colon                         K57.30, Diverticulosis of large intestine without                          perforation or abscess without bleeding  CPT copyright 2022 American Medical Association. All rights reserved.  The codes documented in this report are preliminary and upon  review may   be revised to meet current compliance requirements.  Attending Participation:       I personally performed the entire procedure.  Lillian Peter MD  1/18/2023 9:15:03 AM  This report has been signed electronically.  Number of Addenda: 0  Note Initiated On: 1/18/2023 8:43 AM  Scope Withdrawal Time 0 hours 16 minutes 58 seconds   Total Procedure Duration Time 0 hours 23 minutes 50 seconds   Estimated Blood Loss:       Estimated blood loss: none.    Problem List Items Addressed This Visit       Coronary artery calcification - Primary    Obesity (BMI 30-39.9)            Eran Granados DO, FACC, FACOI

## 2024-02-26 ENCOUNTER — OFFICE VISIT (OUTPATIENT)
Dept: CARDIOLOGY | Facility: CLINIC | Age: 69
End: 2024-02-26
Payer: MEDICARE

## 2024-02-26 VITALS
HEART RATE: 77 BPM | DIASTOLIC BLOOD PRESSURE: 80 MMHG | HEIGHT: 72 IN | BODY MASS INDEX: 33.05 KG/M2 | SYSTOLIC BLOOD PRESSURE: 138 MMHG | WEIGHT: 244 LBS

## 2024-02-26 DIAGNOSIS — E66.9 OBESITY (BMI 30-39.9): ICD-10-CM

## 2024-02-26 DIAGNOSIS — I25.84 CORONARY ARTERY CALCIFICATION: Primary | ICD-10-CM

## 2024-02-26 DIAGNOSIS — I25.10 CORONARY ARTERY CALCIFICATION: Primary | ICD-10-CM

## 2024-02-26 PROCEDURE — 99213 OFFICE O/P EST LOW 20 MIN: CPT | Performed by: INTERNAL MEDICINE

## 2024-02-26 PROCEDURE — 1125F AMNT PAIN NOTED PAIN PRSNT: CPT | Performed by: INTERNAL MEDICINE

## 2024-02-26 PROCEDURE — 93000 ELECTROCARDIOGRAM COMPLETE: CPT | Performed by: INTERNAL MEDICINE

## 2024-02-26 PROCEDURE — 1159F MED LIST DOCD IN RCRD: CPT | Performed by: INTERNAL MEDICINE

## 2024-02-26 PROCEDURE — 1036F TOBACCO NON-USER: CPT | Performed by: INTERNAL MEDICINE

## 2024-02-26 RX ORDER — ROSUVASTATIN CALCIUM 10 MG/1
10 TABLET, COATED ORAL DAILY
Qty: 90 TABLET | Refills: 3 | Status: SHIPPED | OUTPATIENT
Start: 2024-02-26

## 2024-04-03 ENCOUNTER — HOSPITAL ENCOUNTER (EMERGENCY)
Facility: HOSPITAL | Age: 69
Discharge: HOME | End: 2024-04-03
Payer: MEDICARE

## 2024-04-03 VITALS
OXYGEN SATURATION: 98 % | HEIGHT: 71 IN | DIASTOLIC BLOOD PRESSURE: 100 MMHG | HEART RATE: 88 BPM | WEIGHT: 245 LBS | BODY MASS INDEX: 34.3 KG/M2 | RESPIRATION RATE: 18 BRPM | TEMPERATURE: 96.6 F | SYSTOLIC BLOOD PRESSURE: 149 MMHG

## 2024-04-03 DIAGNOSIS — K59.00 CONSTIPATION, UNSPECIFIED CONSTIPATION TYPE: Primary | ICD-10-CM

## 2024-04-03 PROCEDURE — 99282 EMERGENCY DEPT VISIT SF MDM: CPT

## 2024-04-03 PROCEDURE — 99283 EMERGENCY DEPT VISIT LOW MDM: CPT

## 2024-04-03 RX ORDER — DOCUSATE SODIUM 100 MG/1
100 CAPSULE, LIQUID FILLED ORAL EVERY 12 HOURS
Qty: 60 CAPSULE | Refills: 0 | Status: SHIPPED | OUTPATIENT
Start: 2024-04-03 | End: 2024-05-03

## 2024-04-03 ASSESSMENT — PAIN - FUNCTIONAL ASSESSMENT: PAIN_FUNCTIONAL_ASSESSMENT: 0-10

## 2024-04-03 ASSESSMENT — PAIN SCALES - GENERAL: PAINLEVEL_OUTOF10: 0 - NO PAIN

## 2024-04-03 NOTE — ED TRIAGE NOTES
Pt to ED with c/o constipation x1.5 hours pta. Pt states it felt like he was pushing out a softball. Since arrival, pt had BM in [facility] bathroom. Reports pain with BM to the point of tears, denies current pain.

## 2024-04-03 NOTE — ED PROVIDER NOTES
"HPI   Chief Complaint   Patient presents with    Constipation       69-year-old male With past medical history of hyperlipidemia no history of abdominal surgeries presents to the emergency department today for constipation.  Patient states that he had a normal bowel movement 2 days ago.  States this morning he was having a lot of rectal discomfort when trying to have a bowel movement.  States it felt like he was \"trying to push a softball out\".  States he was having so much pain that he called EMS to be transferred to the hospital.  States while in the waiting room he was able to get himself into a position on the toilet and had a very large bowel movement in the waiting room his symptoms have completely resolved since then.  He has not had any abdominal pain, back pain fever or chills throughout all this.  No chest pain or shortness of breath.  States he has been feeling well overall otherwise prior to this.  States that he does not drink much fluids nor fruits and vegetables.  Denies any dizziness, headache, syncope.  He is currently completely asymptomatic at this time.                          [place] Coma Scale Score: 15                  Patient History   Past Medical History:   Diagnosis Date    Adenomatous polyp of ascending colon 03/09/2023 Jan 18, 2023 colonoscopy    Adjustment disorder 03/09/2023    Agatston CAC score, >400 03/09/2023    Seen by Cardiology Feb 2023. Stress test was negative. Started Rosuvastatin 5 mg by Dr. Granados. Follow up q yr.    Former smoker, stopped smoking in distant past 11/21/2023    AAA screen negative 2023.     Hemorrhoids 03/09/2023    Personal history of urinary calculi     History of renal calculi     Past Surgical History:   Procedure Laterality Date    KNEE ARTHROSCOPY W/ DEBRIDEMENT  06/23/2016    Arthroscopy Knee Left    OTHER SURGICAL HISTORY  11/28/2022    Finger fracture repair     No family history on file.  Social History     Tobacco Use    Smoking status: Never    "  Passive exposure: Past    Smokeless tobacco: Never   Vaping Use    Vaping Use: Never used   Substance Use Topics    Alcohol use: Not Currently     Alcohol/week: 1.0 standard drink of alcohol     Types: 1 Cans of beer per week     Comment: once in a blue moon    Drug use: Not Currently       Physical Exam   ED Triage Vitals [04/03/24 1531]   Temperature Heart Rate Respirations BP   35.9 °C (96.6 °F) 88 18 (!) 149/100      Pulse Ox Temp src Heart Rate Source Patient Position   98 % -- -- --      BP Location FiO2 (%)     -- --       Physical Exam  Vitals and nursing note reviewed.   Constitutional:       General: He is not in acute distress.     Appearance: Normal appearance. He is not toxic-appearing.   HENT:      Right Ear: Tympanic membrane normal.      Left Ear: Tympanic membrane normal.      Mouth/Throat:      Mouth: Mucous membranes are moist.      Pharynx: Oropharynx is clear.   Eyes:      Extraocular Movements: Extraocular movements intact.      Pupils: Pupils are equal, round, and reactive to light.   Cardiovascular:      Rate and Rhythm: Normal rate and regular rhythm.      Pulses: Normal pulses.      Heart sounds: Normal heart sounds.   Pulmonary:      Effort: Pulmonary effort is normal.      Breath sounds: Normal breath sounds.   Abdominal:      General: Abdomen is flat. Bowel sounds are normal. There is no distension.      Palpations: Abdomen is soft.      Tenderness: There is no abdominal tenderness. There is no right CVA tenderness or left CVA tenderness.   Musculoskeletal:         General: Normal range of motion.      Cervical back: Normal range of motion and neck supple.   Skin:     General: Skin is warm and dry.      Capillary Refill: Capillary refill takes less than 2 seconds.   Neurological:      General: No focal deficit present.      Mental Status: He is alert and oriented to person, place, and time.   Psychiatric:         Mood and Affect: Mood normal.         Behavior: Behavior normal.          Judgment: Judgment normal.         Labs Reviewed - No data to display  Pain Management Panel          Latest Ref Rng & Units 2/15/2019   Pain Management Panel   Amphetamine Screen, Urine NEGATIVE PRESUMPTIVE NEGATIVE    Barbiturate Screen, Urine NEGATIVE PRESUMPTIVE NEGATIVE    Codeine IgE ng/mL <20    Alcohol, Urine <20 mg/dL <10    Hydromorphone Urine ng/mL <20    Methadone Screen, Urine NEGATIVE PRESUMPTIVE NEGATIVE    Morphine  ng/mL <20      No orders to display       ED Course & MDM   Diagnoses as of 04/03/24 1614   Constipation, unspecified constipation type       Medical Decision Making  On evaluation patient is well-appearing in the emergency department at this time.  He is completely asymptomatic currently.  States he is feeling at his baseline after having large bowel movement in the waiting room.  From his history it sounds that he may have had a slight impaction that he was able to pass on his own without manual disimpaction.  He is having no rectal pain or discomfort.  There is no blood or mucus in his stools.  States it was a very thick large bowel movement.  No abdominal pain.  His vital signs are stable.  I discussed lab work and imaging at this point however shared decision making to not perform any at this time as the symptoms were only present for a few hours and he is completely asymptomatic after having the bowel movement with no history of abdominal surgeries in the past.  I discussed this with his daughters as well and they also feel comfortable with the plan as he is back to his baseline.  I did however discuss strict return precautions and close outpatient follow-up if he begins having any new or worsening symptoms he is to return to the ER immediately for reevaluation.  We discussed increasing fluid intake as well as fiber.  I did write a prescription for Colace in the meantime.  He verbalized understanding agreement this plan has no further questions or concerns and patient be discharged  home in stable condition.        Procedure  Procedures    I discussed the differential, results and discharge plan with the patient and/or family/friend/caregiver if present.  I emphasized the importance of follow-up with the physician I referred them to in the timeframe recommended.  I explained reasons for the patient to return to the Emergency Department. Additional verbal discharge instructions were also given and discussed with the patient to supplement those generated by the EMR. We also discussed medications that were prescribed (if any) including common side effects and interactions. The patient was advised to abstain from driving, operating heavy machinery or making significant decisions while taking medications such as opiates and muscle relaxers that may impair this. All questions were addressed.  They understand return precautions and discharge instructions. The patient and/or family/friend/caregiver expressed understanding.           Crista Lamar, EVONNE-VANDANA  04/03/24 2318

## 2024-10-17 ENCOUNTER — APPOINTMENT (OUTPATIENT)
Dept: PRIMARY CARE | Facility: CLINIC | Age: 69
End: 2024-10-17
Payer: MEDICARE

## 2024-10-17 ENCOUNTER — LAB (OUTPATIENT)
Dept: LAB | Facility: LAB | Age: 69
End: 2024-10-17

## 2024-10-17 VITALS
RESPIRATION RATE: 16 BRPM | HEART RATE: 57 BPM | SYSTOLIC BLOOD PRESSURE: 115 MMHG | TEMPERATURE: 97.3 F | HEIGHT: 71 IN | BODY MASS INDEX: 31.56 KG/M2 | OXYGEN SATURATION: 98 % | WEIGHT: 225.4 LBS | DIASTOLIC BLOOD PRESSURE: 69 MMHG

## 2024-10-17 DIAGNOSIS — R93.1 AGATSTON CAC SCORE, >400: ICD-10-CM

## 2024-10-17 DIAGNOSIS — E78.5 HYPERLIPIDEMIA, UNSPECIFIED HYPERLIPIDEMIA TYPE: ICD-10-CM

## 2024-10-17 DIAGNOSIS — E66.9 OBESITY (BMI 30-39.9): ICD-10-CM

## 2024-10-17 DIAGNOSIS — I25.10 CORONARY ARTERY CALCIFICATION: ICD-10-CM

## 2024-10-17 DIAGNOSIS — R93.1 AGATSTON CAC SCORE, >400: Primary | ICD-10-CM

## 2024-10-17 LAB
ALBUMIN SERPL BCP-MCNC: 4.6 G/DL (ref 3.4–5)
ALP SERPL-CCNC: 62 U/L (ref 33–136)
ALT SERPL W P-5'-P-CCNC: 15 U/L (ref 10–52)
ANION GAP SERPL CALC-SCNC: 12 MMOL/L (ref 10–20)
AST SERPL W P-5'-P-CCNC: 19 U/L (ref 9–39)
BILIRUB SERPL-MCNC: 0.8 MG/DL (ref 0–1.2)
BUN SERPL-MCNC: 22 MG/DL (ref 6–23)
CALCIUM SERPL-MCNC: 9.7 MG/DL (ref 8.6–10.3)
CHLORIDE SERPL-SCNC: 104 MMOL/L (ref 98–107)
CHOLEST SERPL-MCNC: 99 MG/DL (ref 0–199)
CHOLESTEROL/HDL RATIO: 3.1
CO2 SERPL-SCNC: 30 MMOL/L (ref 21–32)
CREAT SERPL-MCNC: 1.2 MG/DL (ref 0.5–1.3)
EGFRCR SERPLBLD CKD-EPI 2021: 66 ML/MIN/1.73M*2
GLUCOSE SERPL-MCNC: 91 MG/DL (ref 74–99)
HDLC SERPL-MCNC: 31.9 MG/DL
LDLC SERPL CALC-MCNC: 47 MG/DL
NON HDL CHOLESTEROL: 67 MG/DL (ref 0–149)
POTASSIUM SERPL-SCNC: 4.7 MMOL/L (ref 3.5–5.3)
PROT SERPL-MCNC: 7.3 G/DL (ref 6.4–8.2)
SODIUM SERPL-SCNC: 141 MMOL/L (ref 136–145)
TRIGL SERPL-MCNC: 103 MG/DL (ref 0–149)
VLDL: 21 MG/DL (ref 0–40)

## 2024-10-17 PROCEDURE — 36415 COLL VENOUS BLD VENIPUNCTURE: CPT

## 2024-10-17 PROCEDURE — 1159F MED LIST DOCD IN RCRD: CPT | Performed by: FAMILY MEDICINE

## 2024-10-17 PROCEDURE — G2211 COMPLEX E/M VISIT ADD ON: HCPCS | Performed by: FAMILY MEDICINE

## 2024-10-17 PROCEDURE — 99213 OFFICE O/P EST LOW 20 MIN: CPT | Performed by: FAMILY MEDICINE

## 2024-10-17 PROCEDURE — 1160F RVW MEDS BY RX/DR IN RCRD: CPT | Performed by: FAMILY MEDICINE

## 2024-10-17 PROCEDURE — 1123F ACP DISCUSS/DSCN MKR DOCD: CPT | Performed by: FAMILY MEDICINE

## 2024-10-17 PROCEDURE — 1158F ADVNC CARE PLAN TLK DOCD: CPT | Performed by: FAMILY MEDICINE

## 2024-10-17 PROCEDURE — 80053 COMPREHEN METABOLIC PANEL: CPT

## 2024-10-17 PROCEDURE — 80061 LIPID PANEL: CPT

## 2024-10-17 PROCEDURE — 3008F BODY MASS INDEX DOCD: CPT | Performed by: FAMILY MEDICINE

## 2024-10-17 PROCEDURE — 1036F TOBACCO NON-USER: CPT | Performed by: FAMILY MEDICINE

## 2024-10-17 ASSESSMENT — ENCOUNTER SYMPTOMS
CONFUSION: 0
COUGH: 0
UNEXPECTED WEIGHT CHANGE: 0
PALPITATIONS: 0
HEADACHES: 0
SHORTNESS OF BREATH: 0

## 2024-10-17 NOTE — PROGRESS NOTES
"Subjective   Patient ID: Jabari Estrada is a 69 y.o. male who presents for Follow-up (Follow up ).    Here to follow up. Last seen one year ago.     Weight down from 251 to 216. On Weight watchers.     Seeing Dr. Granados. He orders cholesterol pill. Due for labs now. No side effects. Has follow up with Dr. Granados in February.     Due for flu shot, will get at pharmacy. Due for Prevnar 15, RSV and Shingles shots.     Quit smoking mid 1990s  No ETOH    Colonoscopy 2022, polyps, due 2025    PSA done 2023    Due for Shingrix, Prevnar 15, RSV, and flu shots. Want so get at pharmacy as has plans today.                Current Outpatient Medications:     rosuvastatin (Crestor) 10 mg tablet, Take 1 tablet (10 mg) by mouth once daily., Disp: 90 tablet, Rfl: 3    Patient Active Problem List   Diagnosis    Adenomatous polyp of ascending colon    Agatston CAC score, >400    Former smoker, stopped smoking in distant past    Coronary artery calcification    Obesity (BMI 30-39.9)    Hyperlipidemia         Review of Systems   Constitutional:  Negative for unexpected weight change.   Respiratory:  Negative for cough and shortness of breath.    Cardiovascular:  Negative for chest pain, palpitations and leg swelling.   Skin:  Negative for rash.   Neurological:  Negative for headaches.   Psychiatric/Behavioral:  Negative for confusion.        Objective   /69 (BP Location: Left arm, Patient Position: Sitting, BP Cuff Size: Large adult)   Pulse 57   Temp 36.3 °C (97.3 °F) (Temporal)   Resp 16   Ht 1.803 m (5' 11\")   Wt 102 kg (225 lb 6.4 oz)   SpO2 98%   BMI 31.44 kg/m²     Physical Exam  Vitals reviewed.   Constitutional:       Appearance: Normal appearance.   Pulmonary:      Effort: Pulmonary effort is normal.   Neurological:      Mental Status: He is alert.   Psychiatric:         Mood and Affect: Mood normal.         Behavior: Behavior normal.         Assessment/Plan   Problem List Items Addressed This Visit       Soheila " CAC score, >400 - Primary     On Rosuvastatin. Followed by Dr. Granados.   Labs today. Follow up with new PCP         Relevant Orders    Lipid Panel    Comprehensive Metabolic Panel    Coronary artery calcification     Followed by Dr. Granados. On Rosuvastatin. Labs due.         Obesity (BMI 30-39.9)     Lost weight from 251 to 225.            Hyperlipidemia     Tolerating Rosuvastatin. Follow up in 6 months.   Schedule wellness at PCP         Relevant Orders    Lipid Panel         Assessment, plans, tests, and follow up discussed with patient and patient verbalized understanding. Jabari was given an opportunity to ask questions and  any concerns were addressed including but not limited to medication, immunizations and follow up.

## 2024-10-17 NOTE — PATIENT INSTRUCTIONS
Get Flu shot and Prevnar 15 at pharmacy.     Also due for shingles vaccines, RSV shot.     Get fasting labs today.

## 2024-11-21 ENCOUNTER — APPOINTMENT (OUTPATIENT)
Dept: PRIMARY CARE | Facility: CLINIC | Age: 69
End: 2024-11-21
Payer: MEDICARE

## 2024-12-07 ENCOUNTER — OFFICE VISIT (OUTPATIENT)
Dept: URGENT CARE | Age: 69
End: 2024-12-07
Payer: MEDICARE

## 2024-12-07 VITALS
HEART RATE: 60 BPM | TEMPERATURE: 98.9 F | RESPIRATION RATE: 16 BRPM | DIASTOLIC BLOOD PRESSURE: 73 MMHG | SYSTOLIC BLOOD PRESSURE: 133 MMHG | OXYGEN SATURATION: 97 %

## 2024-12-07 DIAGNOSIS — T14.8XXA PUNCTURE WOUND: Primary | ICD-10-CM

## 2024-12-07 RX ORDER — SILVER NITRATE 38.21; 12.74 MG/1; MG/1
STICK TOPICAL ONCE
Status: COMPLETED | OUTPATIENT
Start: 2024-12-07 | End: 2024-12-07

## 2024-12-07 ASSESSMENT — ENCOUNTER SYMPTOMS
WOUND: 1
RESPIRATORY NEGATIVE: 1
CARDIOVASCULAR NEGATIVE: 1
CONSTITUTIONAL NEGATIVE: 1

## 2025-01-27 ENCOUNTER — APPOINTMENT (OUTPATIENT)
Dept: PRIMARY CARE | Facility: CLINIC | Age: 70
End: 2025-01-27
Payer: MEDICARE

## 2025-01-27 VITALS
HEART RATE: 60 BPM | TEMPERATURE: 96.6 F | HEIGHT: 71 IN | SYSTOLIC BLOOD PRESSURE: 131 MMHG | RESPIRATION RATE: 16 BRPM | BODY MASS INDEX: 29.68 KG/M2 | WEIGHT: 212 LBS | OXYGEN SATURATION: 98 % | DIASTOLIC BLOOD PRESSURE: 78 MMHG

## 2025-01-27 DIAGNOSIS — Z00.00 ROUTINE GENERAL MEDICAL EXAMINATION AT HEALTH CARE FACILITY: Primary | ICD-10-CM

## 2025-01-27 DIAGNOSIS — R35.81 NOCTURNAL POLYURIA: ICD-10-CM

## 2025-01-27 DIAGNOSIS — Z71.89 ACP (ADVANCE CARE PLANNING): ICD-10-CM

## 2025-01-27 DIAGNOSIS — Z12.5 SCREENING FOR PROSTATE CANCER: ICD-10-CM

## 2025-01-27 PROBLEM — M54.50 LOW BACK PAIN, UNSPECIFIED: Status: ACTIVE | Noted: 2023-03-10

## 2025-01-27 PROBLEM — U07.1 ACUTE DISEASE DUE TO SEVERE ACUTE RESPIRATORY SYNDROME CORONAVIRUS 2 (SARS-COV-2): Status: ACTIVE | Noted: 2025-01-27

## 2025-01-27 PROBLEM — Z20.822 CONTACT WITH AND (SUSPECTED) EXPOSURE TO COVID-19: Status: ACTIVE | Noted: 2022-09-26

## 2025-01-27 PROBLEM — S61.419A LACERATION OF HAND: Status: ACTIVE | Noted: 2025-01-27

## 2025-01-27 PROBLEM — M25.559 ARTHRALGIA OF HIP: Status: ACTIVE | Noted: 2025-01-27

## 2025-01-27 PROBLEM — M54.50 LOW BACK PAIN: Status: ACTIVE | Noted: 2025-01-27

## 2025-01-27 PROBLEM — K63.5 HYPERPLASTIC POLYP OF LARGE INTESTINE: Status: ACTIVE | Noted: 2025-01-27

## 2025-01-27 PROBLEM — R05.9 COUGH: Status: ACTIVE | Noted: 2025-01-27

## 2025-01-27 PROBLEM — S62.338A CLOSED FRACTURE OF NECK OF FIFTH METACARPAL BONE: Status: ACTIVE | Noted: 2025-01-27

## 2025-01-27 PROCEDURE — 3008F BODY MASS INDEX DOCD: CPT | Performed by: STUDENT IN AN ORGANIZED HEALTH CARE EDUCATION/TRAINING PROGRAM

## 2025-01-27 PROCEDURE — 1036F TOBACCO NON-USER: CPT | Performed by: STUDENT IN AN ORGANIZED HEALTH CARE EDUCATION/TRAINING PROGRAM

## 2025-01-27 PROCEDURE — 1170F FXNL STATUS ASSESSED: CPT | Performed by: STUDENT IN AN ORGANIZED HEALTH CARE EDUCATION/TRAINING PROGRAM

## 2025-01-27 PROCEDURE — 99213 OFFICE O/P EST LOW 20 MIN: CPT | Performed by: STUDENT IN AN ORGANIZED HEALTH CARE EDUCATION/TRAINING PROGRAM

## 2025-01-27 PROCEDURE — 1123F ACP DISCUSS/DSCN MKR DOCD: CPT | Performed by: STUDENT IN AN ORGANIZED HEALTH CARE EDUCATION/TRAINING PROGRAM

## 2025-01-27 PROCEDURE — 1160F RVW MEDS BY RX/DR IN RCRD: CPT | Performed by: STUDENT IN AN ORGANIZED HEALTH CARE EDUCATION/TRAINING PROGRAM

## 2025-01-27 PROCEDURE — 1159F MED LIST DOCD IN RCRD: CPT | Performed by: STUDENT IN AN ORGANIZED HEALTH CARE EDUCATION/TRAINING PROGRAM

## 2025-01-27 PROCEDURE — G0439 PPPS, SUBSEQ VISIT: HCPCS | Performed by: STUDENT IN AN ORGANIZED HEALTH CARE EDUCATION/TRAINING PROGRAM

## 2025-01-27 SDOH — ECONOMIC STABILITY: FOOD INSECURITY: WITHIN THE PAST 12 MONTHS, YOU WORRIED THAT YOUR FOOD WOULD RUN OUT BEFORE YOU GOT MONEY TO BUY MORE.: NEVER TRUE

## 2025-01-27 SDOH — ECONOMIC STABILITY: FOOD INSECURITY: WITHIN THE PAST 12 MONTHS, THE FOOD YOU BOUGHT JUST DIDN'T LAST AND YOU DIDN'T HAVE MONEY TO GET MORE.: NEVER TRUE

## 2025-01-27 ASSESSMENT — ENCOUNTER SYMPTOMS
PALPITATIONS: 0
HEADACHES: 0
FEVER: 0
LOSS OF SENSATION IN FEET: 0
COUGH: 0
CONSTIPATION: 0
DIARRHEA: 0
DEPRESSION: 0
SHORTNESS OF BREATH: 0
NAUSEA: 0
OCCASIONAL FEELINGS OF UNSTEADINESS: 0
DIZZINESS: 0
UNEXPECTED WEIGHT CHANGE: 0
ABDOMINAL PAIN: 0
COLOR CHANGE: 0
CHILLS: 0
CONFUSION: 0
WHEEZING: 0
FATIGUE: 0
MUSCULOSKELETAL NEGATIVE: 1
VOMITING: 0

## 2025-01-27 ASSESSMENT — ACTIVITIES OF DAILY LIVING (ADL)
DOING_HOUSEWORK: INDEPENDENT
DRESSING: INDEPENDENT
MANAGING_FINANCES: INDEPENDENT
BATHING: INDEPENDENT
GROCERY_SHOPPING: INDEPENDENT
TAKING_MEDICATION: INDEPENDENT

## 2025-01-27 ASSESSMENT — PATIENT HEALTH QUESTIONNAIRE - PHQ9
SUM OF ALL RESPONSES TO PHQ9 QUESTIONS 1 & 2: 0
2. FEELING DOWN, DEPRESSED OR HOPELESS: NOT AT ALL
1. LITTLE INTEREST OR PLEASURE IN DOING THINGS: NOT AT ALL

## 2025-01-27 ASSESSMENT — LIFESTYLE VARIABLES
HOW OFTEN DO YOU HAVE SIX OR MORE DRINKS ON ONE OCCASION: NEVER
AUDIT-C TOTAL SCORE: 1
HOW OFTEN DO YOU HAVE A DRINK CONTAINING ALCOHOL: MONTHLY OR LESS
SKIP TO QUESTIONS 9-10: 1
HOW MANY STANDARD DRINKS CONTAINING ALCOHOL DO YOU HAVE ON A TYPICAL DAY: 1 OR 2

## 2025-01-27 NOTE — PROGRESS NOTES
Subjective   Patient ID: Jabari Estrada is a 69 y.o. male who presents for Follow-up (Pt to establish with new PCP, former Dr Jackman pt LOV 10/17/2024.  Pt son tested positive for covid, pt has no sx and has been taking temp daily.  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  Son does not live with pt) and Medicare Annual Wellness Visit Subsequent.    Subjective   Reason for Visit: Jabari Estrada is an 69 y.o. male here for a Medicare Wellness visit. Here to [facility] care and [facility] visit. Prev seeing Dr. Jackman. Reports he is doing well, no acute illness. He is taking crestor 10 mg from Dr Granados/Lucio.     Past Medical, Surgical, and Family History reviewed and updated in chart.    Reviewed all medications by prescribing practitioner or clinical pharmacist (such as prescriptions, OTCs, herbal therapies and supplements) and documented in the medical record.    HPI  #HM stuffs  Screening tests:  - Colonoscopy (age 45-75): 06/2023, rec repeat in 7-10 yrs.    - PSA (age 50 and older): last 11/2023, wnl; due now.   - Lipid profile: UTD  - Low dose CT chest (age 50-80): n/a   - AAA screening (age 65-75 who have ever smoked):  UTD    Primary prevention:  - Flu shot: UTD  - RSV (age > 60): rec to get at pharmacy   - COVID vaccines: UTD   - Tdap shot: UTD  - Shingles shot (age >50): rec to get at local pharmacy   - Prevnar 20: UTD   - Statin (age 40-65 or high risk): taking     Counseling:   - ETOH (age>18): very occa, few drinks per yr   - Smoking: former smoker, quit 20 yrs ago.     Patient Care Team:  Raphael Carrillo MD as PCP - General (Family Medicine)  Payton Jackman MD as PCP - [employer] PCP  Eran Granados DO as Consulting Physician (Cardiology)     Review of Systems   Constitutional:  Negative for chills, fatigue, fever and unexpected weight change.   HENT: Negative.     Respiratory:  Negative for cough, shortness of breath and wheezing.    Cardiovascular:  Negative for chest pain,  "palpitations and leg swelling.   Gastrointestinal:  Negative for abdominal pain, constipation, diarrhea, nausea and vomiting.   Musculoskeletal: Negative.    Skin:  Negative for color change and rash.   Neurological:  Negative for dizziness and headaches.   Psychiatric/Behavioral:  Negative for behavioral problems and confusion.        Objective   Vitals:  /78 (BP Location: Right arm, Patient Position: Sitting, BP Cuff Size: Adult)   Pulse 60   Temp 35.9 °C (96.6 °F) (Temporal)   Resp 16   Ht 1.803 m (5' 11\")   Wt 96.2 kg (212 lb)   SpO2 98%   BMI 29.57 kg/m²       Physical Exam  Vitals and nursing note reviewed.   Constitutional:       Appearance: Normal appearance.   HENT:      Right Ear: Tympanic membrane normal.      Left Ear: Tympanic membrane normal.   Eyes:      Extraocular Movements: Extraocular movements intact.      Pupils: Pupils are equal, round, and reactive to light.   Cardiovascular:      Rate and Rhythm: Normal rate and regular rhythm.      Pulses: Normal pulses.      Heart sounds: Normal heart sounds.   Pulmonary:      Effort: Pulmonary effort is normal.      Breath sounds: Normal breath sounds.   Abdominal:      General: Abdomen is flat. Bowel sounds are normal.      Palpations: Abdomen is soft.   Musculoskeletal:         General: Normal range of motion.   Neurological:      General: No focal deficit present.      Mental Status: He is alert.      Cranial Nerves: No cranial nerve deficit.      Sensory: No sensory deficit.      Motor: No weakness.   Psychiatric:         Mood and Affect: Mood normal.         Behavior: Behavior normal.     He is here to [facility] and follow up visit. Appears having some nocturnal polyuria x many mo, likely from enlarged prostate; we will add screening PSA and also add UA reflux to culture. Offer trial of flomax however wants to wait for now. Rec to see us if sx worsens; pending lab work result too. Has HLD, cont crestor as usual; follow heart healthy " diet & follow up with cards as usual. Otherwise clinically stable.   Assessment & Plan  Routine general medical examination at health care facility  # MCR wellness # HM visit   - Discussed ACP with pt; will work on POA/living will paper work   - Immunization per emr  - Age appropriate screenings as listed above in MCR summary : added PSA   - refer MCR summary above for detail  - BW ordered as listed in emr      Orders:    1 Year Follow Up In Primary Care - Wellness Exam; Future    Follow Up In Primary Care - Medicare Annual; Future    Screening for prostate cancer    Orders:    PSA screen; Future    Nocturnal polyuria  As above.   Orders:    Urinalysis with Reflex Culture and Microscopic; Future    ACP (advance care planning)         Advance Directives Discussion  16 - 20 minutes were spent discussing Advanced Care Planning (including a Living Will, Medical Power Of , as well as specific end of life choices and/or directives). The details of that discussion were documented in Advanced Directives Discussion section of the medical record.      Rtc 12 mo for MCR/FU     This note was partially generated using the Dragon Voice recognition system. There may be some incorrect wording, spelling and/or spelling errors or punctuation errors that were not corrected prior to committing the note to the medical record.      Raphael Carrillo MD    [place], Family Medicine

## 2025-01-31 LAB
APPEARANCE UR: CLEAR
BACTERIA #/AREA URNS HPF: NORMAL /HPF
BACTERIA UR CULT: NORMAL
BILIRUB UR QL STRIP: NEGATIVE
COLOR UR: YELLOW
GLUCOSE UR QL STRIP: NEGATIVE
HGB UR QL STRIP: NEGATIVE
HYALINE CASTS #/AREA URNS LPF: NORMAL /LPF
KETONES UR QL STRIP: NEGATIVE
LEUKOCYTE ESTERASE UR QL STRIP: NEGATIVE
NITRITE UR QL STRIP: NEGATIVE
PH UR STRIP: 8 [PH] (ref 5–8)
PROT UR QL STRIP: NEGATIVE
PSA SERPL-MCNC: 0.69 NG/ML
RBC #/AREA URNS HPF: NORMAL /HPF
SERVICE CMNT-IMP: NORMAL
SP GR UR STRIP: 1.01 (ref 1–1.03)
SQUAMOUS #/AREA URNS HPF: NORMAL /HPF
WBC #/AREA URNS HPF: NORMAL /HPF

## 2025-02-26 NOTE — PROGRESS NOTES
[facility] Vascular Cardiology    Patient Name: Jabari Estrada  Patient : 1955    Scribe Attestation  By signing my name below, Joana DEL REAL, Tomasaibtram attest that this documentation has been prepared under the direction and in the presence of Eran Granados DO.    Physician Attestation  Eran DEL REAL DO, personally performed the services described in the documentation as scribed by Joana Ruiz in my presence, and confirm it is both accurate and complete.    Reason for visit:  This is a 69-year-old male here for follow-up regarding coronary artery calcification and obesity.     HPI:  This is a 69-year-old male here for follow-up regarding coronary artery calcification and obesity.  The patient was last evaluated by me in 2024.  At that visit I had increased rosuvastatin to 10 mg daily and asked that he follow-up in 1 year.  CMP done in 2024 showed normal serum sodium and potassium with a serum creatinine of 1.20, normal ALT/AST.  Lipid panel done in 2024 showed an LDL cholesterol 47 and triglycerides of 103 while on rosuvastatin 10 mg daily. ECG done today showed sinus bradycardia with a heart rate of  55 bpm. The patient reports that he has been feeling generally well from the cardiac standpoint. He denies any new chest pain, shortness of breath, palpitations and lightheadedness. He reports that he plays basketball to stay physically active. He states that he does not monitor his blood pressure at home. He states that he takes all of his medications as prescribed. During my exam, he was resting comfortably on the exam table.            Assessment/Plan:   1. Coronary artery calcification   The patient has a history of coronary artery calcification with a total score of 1016.   ECG done today showed sinus bradycardia with a heart rate of  55 bpm.   He denies anginal chest discomfort.   Blood pressure appears moderately controlled on exam  today.  Nuclear stress done 1/25/2023 showed a small partially reversible perfusion defect in the apical wall which improved on prone imaging suggesting soft tissue attenuation, low probability of ischemia, calculated ejection fraction 61%.  Recent lab works as noted in the HPI.  Lipid panel done in October 2024 showed an LDL cholesterol 47 and triglycerides of 103 while on rosuvastatin 10 mg daily.  Please see lifestyle recommendations below.  Follow up in 1 year and sooner if necessary.     2. Dyslipidemia  Lipid panel done in October 2024 showed an LDL cholesterol 47 and triglycerides of 103 while on rosuvastatin 10 mg daily.  Please see lifestyle recommendations below.    3. Obesity  Please see lifestyle recommendations below.    Order:   Follow-up in 1 year.    Lifestyle Recommendations  I recommend a whole-food plant-based diet, an eating pattern that encourages the consumption of unrefined plant foods (such as fruits, vegetables, tubers, whole grains, legumes, nuts and seeds) and discourages meats, dairy products, eggs and processed foods.     The AHA/ACC recommends that the patient consume a dietary pattern that emphasizes intake of vegetables, fruits, and whole grains; includes low-fat dairy products, poultry, fish, legumes, non-tropical vegetable oils, and nuts; and limits intake of sodium, sweets, sugar-sweetened beverages, and red meats.  Adapt this dietary pattern to appropriate calorie requirements (a 500-750 kcal/day deficit to loose weight), personal and cultural food preferences, and nutrition therapy for other medical conditions (including diabetes).  Achieve this pattern by following plans such as the [place] Mediterranean, DASH dietary pattern, or AHA diet.     Engage in 2 hours and 30 minutes per week of moderate-intensity physical activity, or 1 hour and 15 minutes (75 minutes) per week of vigorous-intensity aerobic physical activity, or an equivalent combination of moderate and  vigorous-intensity aerobic physical activity. Aerobic activity should be performed in episodes of at least 10 minutes preferably spread throughout the week.     Adhering to a heart healthy diet, regular exercise habits, avoidance of tobacco products, and maintenance of a healthy weight are crucial components of their heart disease risk reduction.     Any positive review of systems not specifically addressed in the office visit today should be evaluated and treated by the patients primary care physician or in an emergency department if necessary     Patient was notified that results from ordered tests will be called to the patient if it changes current management; it will otherwise be discussed at a future appointment and available on  [facility].     Thank you for allowing me to participate in the care of this patient.        This document was generated using the assistance of voice recognition software. If there are any errors of spelling, grammar, syntax, or meaning; please feel free to contact me directly for clarification.    Past Medical History:  He has a past medical history of Adenomatous polyp of ascending colon (03/09/2023), Adjustment disorder (03/09/2023), Agatston CAC score, >400 (03/09/2023), Former smoker, stopped smoking in distant past (11/21/2023), Hemorrhoids (03/09/2023), and Personal history of urinary calculi.    Past Surgical History:  He has a past surgical history that includes Knee arthroscopy w/ debridement (06/23/2016) and Other surgical history (11/28/2022).      Social History:  He reports that he has quit smoking. His smoking use included cigarettes. He started smoking about 53 years ago. He has a 13.3 pack-year smoking history. He has been exposed to tobacco smoke. He has never used smokeless tobacco. He reports that he does not currently use alcohol after a past usage of about 1.0 standard drink of alcohol per week. He reports that he does not currently use drugs.    Family  "History:  No family history on file.     Allergies:  Iodine    Outpatient Medications:  Current Outpatient Medications   Medication Instructions    rosuvastatin (CRESTOR) 10 mg, oral, Daily        ROS:  A 14 point review of systems was done and is negative other than as stated in HPI    Vitals:      11/21/2023    11:21 AM 2/26/2024    11:34 AM 3/5/2024     9:29 AM 4/3/2024     3:31 PM 10/17/2024     9:40 AM 12/7/2024     5:28 PM 1/27/2025     1:45 PM   Vitals   Systolic 127 138 130 149 115 133 131   Diastolic 86 80 83 100 69 73 78   BP Location     Left arm  Right arm   Heart Rate 72 77 67 88 57 60 60   Temp 36.4 °C (97.5 °F)  36.4 °C (97.6 °F) 35.9 °C (96.6 °F) 36.3 °C (97.3 °F) 37.2 °C (98.9 °F) 35.9 °C (96.6 °F)   Resp 14  18 18 16 16 16   Height 1.822 m (5' 11.75\") 1.822 m (5' 11.75\")  1.803 m (5' 11\") 1.803 m (5' 11\")  1.803 m (5' 11\")   Weight (lb) 244.6 244  245 225.4  212   BMI 33.41 kg/m2 33.32 kg/m2  34.17 kg/m2 31.44 kg/m2  29.57 kg/m2   BSA (m2) 2.37 m2 2.37 m2  2.36 m2 2.26 m2  2.2 m2        Physical Exam:   Constitutional: Cooperative, in no acute distress, alert, appears stated age.   Skin: Skin color, texture, turgor normal. No rashes or lesions.   Head: Normocephalic. No masses, lesions, tenderness or abnormalities   Eyes: Extraocular movements are grossly intact.   Mouth and throat: Mucous membranes moist   Neck: Neck supple, no carotid bruits, no JVD   Respiratory: Lungs clear to auscultation, no wheezing or rhonchi, no use of accessory muscles   Chest wall: No scars, normal excursion with respiration   Cardiovascular: Regular rhythm without murmur, gallop, or rubs   Gastrointestinal: Abdomen soft, nontender. Bowel sounds normal. Obese.  Musculoskeletal: Strength equal in upper extremities   Extremities: No pitting edema   Neurologic: Sensation grossly intact, alert and oriented x3    Intake/Output:   No intake/output data recorded.    Outpatient Medications  Current Outpatient Medications on File " Prior to Visit   Medication Sig Dispense Refill    rosuvastatin (Crestor) 10 mg tablet Take 1 tablet (10 mg) by mouth once daily. 90 tablet 3     No current facility-administered medications on file prior to visit.       Labs: (past 26 weeks)  Recent Results (from the past 26 weeks)   Lipid Panel    Collection Time: 10/17/24 10:30 AM   Result Value Ref Range    Cholesterol 99 0 - 199 mg/dL    HDL-Cholesterol 31.9 mg/dL    Cholesterol/HDL Ratio 3.1     LDL Calculated 47 <=99 mg/dL    VLDL 21 0 - 40 mg/dL    Triglycerides 103 0 - 149 mg/dL    Non HDL Cholesterol 67 0 - 149 mg/dL   Comprehensive Metabolic Panel    Collection Time: 10/17/24 10:30 AM   Result Value Ref Range    Glucose 91 74 - 99 mg/dL    Sodium 141 136 - 145 mmol/L    Potassium 4.7 3.5 - 5.3 mmol/L    Chloride 104 98 - 107 mmol/L    Bicarbonate 30 21 - 32 mmol/L    Anion Gap 12 10 - 20 mmol/L    Urea Nitrogen 22 6 - 23 mg/dL    Creatinine 1.20 0.50 - 1.30 mg/dL    eGFR 66 >60 mL/min/1.73m*2    Calcium 9.7 8.6 - 10.3 mg/dL    Albumin 4.6 3.4 - 5.0 g/dL    Alkaline Phosphatase 62 33 - 136 U/L    Total Protein 7.3 6.4 - 8.2 g/dL    AST 19 9 - 39 U/L    Bilirubin, Total 0.8 0.0 - 1.2 mg/dL    ALT 15 10 - 52 U/L   PSA screen    Collection Time: 01/30/25  9:20 AM   Result Value Ref Range    PSA, TOTAL 0.69 < OR = 4.00 ng/mL   Urinalysis with Reflex Culture and Microscopic    Collection Time: 01/30/25  9:20 AM   Result Value Ref Range    COLOR YELLOW YELLOW    APPEARANCE CLEAR CLEAR    SPECIFIC GRAVITY 1.012 1.001 - 1.035    PH 8.0 5.0 - 8.0    GLUCOSE NEGATIVE NEGATIVE    BILIRUBIN NEGATIVE NEGATIVE    KETONES NEGATIVE NEGATIVE    OCCULT BLOOD NEGATIVE NEGATIVE    PROTEIN NEGATIVE NEGATIVE    NITRITE NEGATIVE NEGATIVE    LEUKOCYTE ESTERASE NEGATIVE NEGATIVE    WBC NONE SEEN < OR = 5 /HPF    RBC NONE SEEN < OR = 2 /HPF    SQUAMOUS EPITHELIAL CELLS NONE SEEN < OR = 5 /HPF    BACTERIA NONE SEEN NONE SEEN /HPF    HYALINE CAST NONE SEEN NONE SEEN /LPF    NOTE       REFLEXIVE URINE CULTURE         ECG  No results found for this or any previous visit (from the past 4464 hours).    Echocardiogram  No results found for this or any previous visit from the past 1095 days.      CV Studies:  EKG:No results found for this or any previous visit (from the past 4464 hours).  Echocardiogram: No results found for this or any previous visit from the past 1825 days.    Stress Testing IMESULT(OOT9095:1:1825):   NM CARDIAC STRESS REST (MYOCARDIAL PERFUSION MIBI) 01/25/2023    Narrative  MRN: [id number]  Patient Name: FARHAN WILSON    STUDY:  CARDIAC STRESS/REST INJECTION; PART 2 STRESS OR REST (NO CHARGE);  CARDIAC STRESS/REST (MYOCARDIAL PERFUSION/MIBI);  1/25/2023 9:33 am    INDICATION:  Coronary artery calcification  I25.10: Coronary artery calcification  E66.9: Obesity I25.84:.    COMPARISON:  None.    ACCESSION NUMBER(S):  [id number]; [id number]; [id number]    ORDERING CLINICIAN:  MARIA DOLORES MARIE    TECHNIQUE:  DIVISION OF NUCLEAR MEDICINE  STRESS MYOCARDIAL PERFUSION SCAN, ONE DAY PROTOCOL    The patient received an intravenous dose of  11.2 mCi of Tc-99m  tetrofosmin and resting emission tomographic (SPECT) images of the  myocardium were acquired. The patient then exercised via treadmill  stress to  97 % of MPHR and achieved  10.1 METS. At peak stress  31.7  mCi of Tc-99m tetrofosmin were administered and stress phase SPECT  images of the myocardium were then acquired. These included ECG-gated  images to assess and quantify ventricular function.    FINDINGS:    There is a small partially reversible perfusion defect in the apical  wall which is improved on prone imaging suggestive of soft tissue  attenuation. The gated ejection fraction is  61%(Normal over 50%).    Impression  1. There is a small partially reversible perfusion defect in the  apical wall which is improved on prone imaging suggestive of soft  tissue attenuation. There is a low probability of ischemia.  2. Calculated ejection  fraction is 61% without segmental wall motion  abnormality seen.    Cardiac Catheterization: No results found for this or any previous visit from the past 1825 days.  No results found for this or any previous visit from the past 3650 days.     Cardiac Scoring:   CT HEART CALCIUM SCORING WO IV CONTRAST 01/03/2023    Narrative  MRN: [id number]  Patient Name: FARHAN WILSON    STUDY:  CT CARDIAC SCORING;  1/3/2023 12:30 pm    INDICATION:  Screening  Z12.11: Screening for colon cancer.    COMPARISON:  None.    ACCESSION NUMBER(S):  [id number]    ORDERING CLINICIAN:  VIDHYA ZENG    TECHNIQUE:  Using prospective ECG gating, CT scan of the coronary arteries was  performed without intravenous contrast. Coronary calcium scoring  was  performed according to the method of Agatston.    FINDINGS:  The score and distribution of calcium in the coronary arteries is as  follows:    LM 0,  ,  LCx 212,  ,    Total 1016    The visualized ascending thoracic aorta measures 3.6 cm in diameter.  The heart is normal in size. No pericardial effusion is present.    No gross evidence of mediastinal or hilar lymphadenopathy or masses  is identified. The visualized segments of the lungs are normally  expanded.    The main pulmonary artery, right and left pulmonary artery are normal  in size.    The visualized subdiaphragmatic structures appear intact.    Impression  1. Coronary artery calcium score of 1016*.    *Coronary Artery  Agatston score    Score  risk  Very low 1-99  Mildly increased 100-299  Moderately increased >300  Moderate to severely increased >800    [place] et al. JCCT 2016 (http://dx.doi.org/10.1016/j.jcct.[email or web address])    FELICIANO 10-Year CHD Risk with Coronary Artery Calcification can be  calcuate using link below  [email or web address]  Inderjit et al. JACC 2015 (http://dx.doi.org/10.1016/j.j  acc.2015.08.035)    AAA : No results found for this or any previous visit  from the past 1825 days.    OTHER: No results found for this or any previous visit from the past 1825 days.    LAST IMAGING RESULTS  ECG 12 Lead  Sinus rhythm, possible anterior infarct age undetermined, heart rate 77   bpm.    Problem List Items Addressed This Visit       Coronary artery calcification - Primary    Obesity (BMI 30-39.9)    Hyperlipidemia          Eran Granados DO, FACC, FACOI

## 2025-03-03 ENCOUNTER — APPOINTMENT (OUTPATIENT)
Dept: CARDIOLOGY | Facility: CLINIC | Age: 70
End: 2025-03-03
Payer: MEDICARE

## 2025-03-03 VITALS
SYSTOLIC BLOOD PRESSURE: 130 MMHG | HEART RATE: 55 BPM | DIASTOLIC BLOOD PRESSURE: 88 MMHG | WEIGHT: 212 LBS | BODY MASS INDEX: 29.68 KG/M2 | HEIGHT: 71 IN

## 2025-03-03 DIAGNOSIS — E66.9 OBESITY (BMI 30-39.9): ICD-10-CM

## 2025-03-03 DIAGNOSIS — I25.10 CORONARY ARTERY CALCIFICATION: Primary | ICD-10-CM

## 2025-03-03 DIAGNOSIS — E78.00 PURE HYPERCHOLESTEROLEMIA: ICD-10-CM

## 2025-03-03 LAB
ATRIAL RATE: 55 BPM
P AXIS: 41 DEGREES
P OFFSET: 190 MS
P ONSET: 139 MS
PR INTERVAL: 144 MS
Q ONSET: 211 MS
QRS COUNT: 9 BEATS
QRS DURATION: 86 MS
QT INTERVAL: 428 MS
QTC CALCULATION(BAZETT): 409 MS
QTC FREDERICIA: 415 MS
R AXIS: 190 DEGREES
T AXIS: 11 DEGREES
T OFFSET: 425 MS
VENTRICULAR RATE: 55 BPM

## 2025-03-03 PROCEDURE — 93005 ELECTROCARDIOGRAM TRACING: CPT | Performed by: INTERNAL MEDICINE

## 2025-03-03 PROCEDURE — 99213 OFFICE O/P EST LOW 20 MIN: CPT | Performed by: INTERNAL MEDICINE

## 2025-03-03 PROCEDURE — 93010 ELECTROCARDIOGRAM REPORT: CPT | Performed by: INTERNAL MEDICINE

## 2025-03-03 PROCEDURE — 1159F MED LIST DOCD IN RCRD: CPT | Performed by: INTERNAL MEDICINE

## 2025-03-03 PROCEDURE — 99213 OFFICE O/P EST LOW 20 MIN: CPT | Mod: 25 | Performed by: INTERNAL MEDICINE

## 2025-03-03 PROCEDURE — 3008F BODY MASS INDEX DOCD: CPT | Performed by: INTERNAL MEDICINE

## 2025-03-03 PROCEDURE — 1036F TOBACCO NON-USER: CPT | Performed by: INTERNAL MEDICINE

## 2025-03-03 PROCEDURE — 1123F ACP DISCUSS/DSCN MKR DOCD: CPT | Performed by: INTERNAL MEDICINE

## 2025-03-14 ENCOUNTER — TELEPHONE (OUTPATIENT)
Dept: PRIMARY CARE | Facility: CLINIC | Age: 70
End: 2025-03-14
Payer: MEDICARE

## 2025-04-30 DIAGNOSIS — I25.10 CORONARY ARTERY CALCIFICATION: ICD-10-CM

## 2025-04-30 DIAGNOSIS — E66.9 OBESITY (BMI 30-39.9): ICD-10-CM

## 2025-04-30 RX ORDER — ROSUVASTATIN CALCIUM 10 MG/1
10 TABLET, COATED ORAL DAILY
Qty: 90 TABLET | Refills: 3 | Status: SHIPPED | OUTPATIENT
Start: 2025-04-30

## 2025-06-02 ENCOUNTER — ANCILLARY PROCEDURE (OUTPATIENT)
Dept: URGENT CARE | Age: 70
End: 2025-06-02
Payer: MEDICARE

## 2025-06-02 ENCOUNTER — OFFICE VISIT (OUTPATIENT)
Dept: URGENT CARE | Age: 70
End: 2025-06-02
Payer: MEDICARE

## 2025-06-02 VITALS
RESPIRATION RATE: 16 BRPM | SYSTOLIC BLOOD PRESSURE: 118 MMHG | DIASTOLIC BLOOD PRESSURE: 64 MMHG | OXYGEN SATURATION: 98 % | HEART RATE: 68 BPM

## 2025-06-02 DIAGNOSIS — S63.112S SUBLUXATION OF METACARPOPHALANGEAL JOINT OF LEFT THUMB, SEQUELA: ICD-10-CM

## 2025-06-02 DIAGNOSIS — M79.642 LEFT HAND PAIN: Primary | ICD-10-CM

## 2025-06-02 DIAGNOSIS — S62.619A CLOSED FRACTURE OF PROXIMAL PHALANX OF DIGIT OF LEFT HAND, INITIAL ENCOUNTER: ICD-10-CM

## 2025-06-02 PROCEDURE — 1036F TOBACCO NON-USER: CPT | Performed by: NURSE PRACTITIONER

## 2025-06-02 PROCEDURE — 99213 OFFICE O/P EST LOW 20 MIN: CPT | Performed by: NURSE PRACTITIONER

## 2025-06-02 PROCEDURE — 73130 X-RAY EXAM OF HAND: CPT | Mod: LEFT SIDE | Performed by: NURSE PRACTITIONER

## 2025-06-02 PROCEDURE — 1159F MED LIST DOCD IN RCRD: CPT | Performed by: NURSE PRACTITIONER

## 2025-06-02 RX ORDER — DICLOFENAC SODIUM 10 MG/G
4 GEL TOPICAL 4 TIMES DAILY PRN
Qty: 100 G | Refills: 0 | Status: SHIPPED | OUTPATIENT
Start: 2025-06-02 | End: 2025-06-12

## 2025-06-02 ASSESSMENT — ENCOUNTER SYMPTOMS: ARTHRALGIAS: 1

## 2025-06-02 NOTE — PROGRESS NOTES
Subjective   Patient ID: Jabari Estrada is a 70 y.o. male. They present today with a chief complaint of Other (Pt c/o pain and tenderness in left second proximal joint, onset last PM, denies known injury/trauma).    History of Present Illness    History provided by:  Patient   used: No    Injury  Location:  Left 2nd digit  Quality:  Sharp pain  Severity:  Moderate  Onset quality:  Sudden  Duration:  6 days  Timing:  Constant  Progression:  Unchanged  Chronicity:  New  Context:  A basketball struck his left hand about a week ago. The swelling started, but no pain. He played more basketball and went golfing and the pain and redness started.  Relieved by:  Advil and Tylenol, cold and warm compress  Worsened by:  Activities      Past Medical History  Allergies as of 06/02/2025 - Reviewed 06/02/2025   Allergen Reaction Noted    Iodine Hives and Rash 03/09/2023       Prescriptions Prior to Admission[1]     Medical History[2]    Surgical History[3]     reports that he has quit smoking. His smoking use included cigarettes. He started smoking about 53 years ago. He has a 13.4 pack-year smoking history. He has been exposed to tobacco smoke. He has never used smokeless tobacco. He reports that he does not currently use alcohol after a past usage of about 1.0 standard drink of alcohol per week. He reports that he does not currently use drugs.    Review of Systems  Review of Systems   Musculoskeletal:  Positive for arthralgias.            Objective    Vitals:    06/02/25 1519   BP: 118/64   Pulse: 68   Resp: 16   SpO2: 98%     No LMP for male patient.    Physical Exam    Procedures    Point of Care Test & Imaging Results from this visit  No results found for this visit on 06/02/25.   Imaging  XR hand left 3+ views  Result Date: 6/2/2025  Irregularity of the base of the 2nd proximal phalanx, which may be related to a tiny avulsion fracture. Correlation with the site of pain is suggested. Further evaluation  with CT or MRI may be obtained if clinically warranted     MACRO: None   Signed by: Nela Joseph 6/2/2025 3:48 PM Dictation workstation:   [id number]      Cardiology, Vascular, and Other Imaging  No other imaging results found for the past 2 days      Diagnostic study results (if any) were reviewed by ANUSHA Lea.    Assessment/Plan   Allergies, medications, history, and pertinent labs/EKGs/Imaging reviewed by ANUSHA Lea.     Medical Decision Making  PT declines prednisone and flexeril.   Use Diclofenac gel as needed.   Rest, ice, elevation and compression discussed.  Pt will continue Tylenol and ibuprofen as needed for aches and pain.  Pain should improve in 1-2 weeks.  Referred to ortho.  If symptoms do not improve, advised to return and/or follow-up with PCP.  Call 911 or go to the nearest ER if the symptoms worsen.  Patient verbalized understanding of these instructions and left in stable condition.      Orders and Diagnoses  Diagnoses and all orders for this visit:  Left hand pain  -     XR hand left 3+ views  -     diclofenac sodium (Voltaren) 1 % gel; Apply 4.5 inches (4 g) topically 4 times a day as needed (pain and/or swelling) for up to 10 days.  -     Referral to Orthopedics and Sports Medicine; Future  Subluxation of metacarpophalangeal joint of left thumb, sequela  Closed fracture of proximal phalanx of digit of left hand, initial encounter      Medical Admin Record  Good    Patient disposition: Home    Electronically signed by ANUSHA Lea  5:31 PM           [1] (Not in a hospital admission)   [2]   Past Medical History:  Diagnosis Date    Adenomatous polyp of ascending colon 03/09/2023 Jan 18, 2023 colonoscopy    Adjustment disorder 03/09/2023    Agatston CAC score, >400 03/09/2023    Seen by Cardiology Feb 2023. Stress test was negative. Started Rosuvastatin 5 mg by Dr. Granados. Follow up q yr.    Former smoker, stopped smoking in distant past 11/21/2023    AAA screen  negative 2023.     Hemorrhoids 03/09/2023    Personal history of urinary calculi     History of renal calculi   [3]   Past Surgical History:  Procedure Laterality Date    KNEE ARTHROSCOPY W/ DEBRIDEMENT  06/23/2016    Arthroscopy Knee Left    OTHER SURGICAL HISTORY  11/28/2022    Finger fracture repair

## 2025-06-12 ENCOUNTER — APPOINTMENT (OUTPATIENT)
Dept: ORTHOPEDIC SURGERY | Facility: HOSPITAL | Age: 70
End: 2025-06-12
Payer: MEDICARE

## 2025-07-03 ENCOUNTER — APPOINTMENT (OUTPATIENT)
Dept: PRIMARY CARE | Facility: CLINIC | Age: 70
End: 2025-07-03
Payer: MEDICARE

## 2026-01-29 ENCOUNTER — APPOINTMENT (OUTPATIENT)
Dept: PRIMARY CARE | Facility: CLINIC | Age: 71
End: 2026-01-29
Payer: MEDICARE